# Patient Record
Sex: MALE | Race: WHITE | NOT HISPANIC OR LATINO | Employment: FULL TIME | URBAN - METROPOLITAN AREA
[De-identification: names, ages, dates, MRNs, and addresses within clinical notes are randomized per-mention and may not be internally consistent; named-entity substitution may affect disease eponyms.]

---

## 2023-05-18 ENCOUNTER — APPOINTMENT (OUTPATIENT)
Dept: PRIMARY CARE | Facility: CLINIC | Age: 33
End: 2023-05-18
Payer: COMMERCIAL

## 2023-05-23 ENCOUNTER — APPOINTMENT (OUTPATIENT)
Dept: PRIMARY CARE | Facility: CLINIC | Age: 33
End: 2023-05-23
Payer: COMMERCIAL

## 2025-07-15 NOTE — PROGRESS NOTES
ADULT AUDIOLOGY EVALUATION    Name:  Ashish Burrell  :  1990  Age:  34 y.o.  Date of Evaluation:  2025     IMPRESSIONS     Today's test results indicate normal hearing sensitivity in both ears. Tympanometry indicates normal middle ear functioning bilaterally, with no evidence of current tympanic membrane perforation. Word recognition is excellent in both ears.     RECOMMENDATIONS     Medical follow up with ENT. Patient is scheduled to see APOLLO Newberry directly following today's testing.  Return for annual hearing evaluation or sooner should new concerns arise.    Time: [id number]    HISTORY     Ashish Burrell is seen today for an audiologic evaluation in conjunction with otolaryngology. Patient reports concern for perforated right eardrum. He frequently swims and reports frequent use of alcohol drops in his ears to remove water. Several days ago his head was submerged and he heard loud popping and experienced intense pain in the right ear. He noted that his hearing felt muffled after this incident, but now feels largely improved. Patient endorsed some residual pressure and tenderness in both ears, and may hear a swishing sound in his right ear with head movements. Ashish endorsed a history of swimmer's ear. He denied other tinnitus, dizziness, and history of otologic surgeries.      TEST RESULTS     Otoscopic Evaluation:  Right Ear: Clear ear canal with unremarkable tympanic membrane  Left Ear: Clear ear canal with unremarkable tympanic membrane    Tympanometry:   Right Ear: Normal, type A tympanogram with normal ear canal volume, peak pressure and compliance.   Left Ear: Normal, type A tympanogram with normal ear canal volume, peak pressure and compliance.     Ipsilateral Acoustic Reflexes:   Right Ear: Present 500-2000 Hz, absent 4000 Hz.   Left Ear: Present 500-2000 Hz, absent 4000 Hz.     Pure Tone Audiometry (125-8000 Hz):     Right Ear: Normal hearing sensitivity from 125-8000 Hz. No  air-bone gaps present.     Left Ear: Normal hearing sensitivity from 125-8000 Hz. No air-bone gaps present.     Speech Audiometry:   Right Ear:    Speech Reception Threshold (SRT) was obtained at 10 dBHL using recorded material.   Word Recognition scores were excellent (100%) in quiet when words were presented at 55 dBHL using recorded NU-6 word list ordered by difficulty.  Left Ear:    Speech Reception Threshold (SRT) was obtained at 15 dBHL using recorded material.   Word Recognition scores were excellent (100%) in quiet when words were presented at 55 dBHL using recorded NU-6 word list ordered by difficulty.       PATIENT EDUCATION     Patient was counseled in regard to findings.         Carrie Najera, CCC-A  Clinical Audiologist    Degree of Hearing Sensitivity Decibel Range   Within Normal Limits (WNL) 0-25   Mild 26-40   Moderate 41-55   Moderately-Severe 56-70   Severe 71-90   Profound 91+      Key   CNT/DNT Could Not Test/Did Not Test   TM Tympanic Membrane   WNL Within Normal Limits   HA Hearing Aid   SNHL Sensorineural Hearing Loss   CHL Conductive Hearing Loss   NIHL Noise-Induced Hearing Loss   ECV Ear Canal Volume   MLV Monitored Live Voice     AUDIOGRAM

## 2025-07-17 ENCOUNTER — OFFICE VISIT (OUTPATIENT)
Facility: CLINIC | Age: 35
End: 2025-07-17
Payer: COMMERCIAL

## 2025-07-17 ENCOUNTER — CLINICAL SUPPORT (OUTPATIENT)
Dept: AUDIOLOGY | Facility: CLINIC | Age: 35
End: 2025-07-17
Payer: COMMERCIAL

## 2025-07-17 VITALS — BODY MASS INDEX: 25.38 KG/M2 | HEIGHT: 71 IN

## 2025-07-17 DIAGNOSIS — H92.01 OTALGIA, RIGHT EAR: ICD-10-CM

## 2025-07-17 DIAGNOSIS — H93.8X3 PRESSURE SENSATION IN BOTH EARS: ICD-10-CM

## 2025-07-17 DIAGNOSIS — Z01.10 NORMAL HEARING EXAM: Primary | ICD-10-CM

## 2025-07-17 DIAGNOSIS — H61.893 EAR CANAL DRYNESS, BILATERAL: Primary | ICD-10-CM

## 2025-07-17 DIAGNOSIS — H93.8X3 EAR FULLNESS, BILATERAL: ICD-10-CM

## 2025-07-17 PROCEDURE — 92557 COMPREHENSIVE HEARING TEST: CPT

## 2025-07-17 PROCEDURE — 92504 EAR MICROSCOPY EXAMINATION: CPT

## 2025-07-17 PROCEDURE — 99203 OFFICE O/P NEW LOW 30 MIN: CPT

## 2025-07-17 PROCEDURE — 92550 TYMPANOMETRY & REFLEX THRESH: CPT | Mod: 52

## 2025-07-17 RX ORDER — AMOXICILLIN AND CLAVULANATE POTASSIUM 875; 125 MG/1; MG/1
1 TABLET, FILM COATED ORAL
COMMUNITY
Start: 2025-07-11

## 2025-07-17 NOTE — PROGRESS NOTES
Patient ID: Ashish Burrell is a 34 y.o. male who presents for the evaluation of ***.     Ear canal dryness-- otherwise normal    Ear flushes-- once weekly  Dry ear precautions after swimming  May continue to wear ear plugs while swimming lessions x 6 hrs daily.   Avoid isopropyl alcohol rinses  Fuv prn    P.o. augmentin BID x 10 days on 7/11/25--seen by urgent care    Using o.t.c. swimmer's ear drops.   [place] popping in the ear right ear on 7/8/25-- popping and pain in the ear. Was hearing swishing in the ear.     No pain     PROVIDER IMPRESSIONS:  DIAGNOSES/PROBLEMS:  -    ASSESSMENT:   Ashish Burrell is a pleasant 34 y.o. male who presents with symptoms of ***.   Based on the clinical information provided, symptoms and clinical exam findings are consistent with ***.   Reassurance provided to patient that exam today showed no evidence of acute infection or inflammation in the EAC bilaterally and that TM appears with no evidence of infection, effusion, retraction or perforation bilaterally.  Audiogram reviewed in detail with the patient, which revealed ***.     PLAN:  ***  ***  Follow-up: Patient may schedule for follow-up in {Time; 1 week to 1 year:17682}. They may follow-up sooner, if needed. Patient is agreeable to this plan, all questions were answered to patient's satisfaction.     Subjective   HPI: Ashish Burrell is a 34 y.o. male who presents for the evaluation of ***.  The patient states that symptom onset began ***.   When asked about the presence of hearing loss, ear pain, ear fullness/pressure, tinnitus, ear itching, ear drainage, autophony, dizziness or vertigo, he admits to ***.   When asked about pertinent otologic history, the patient {DENIES/REPORTS EC:89234} a history of recurrent ear infections, {DENIES/REPORTS EC:24966} history of ear surgery, {DENIES/REPORTS EC:35614} history of PE tube insertion, and {DENIES/REPORTS EC:00124} history of prolonged/traumatic loud noise exposure.   The patient  {DOES_DOES NOT:96613} insert Q-tips in the ear canals, and  {DENIES/REPORTS EC:85346} insertion of other foreign objects into the ear canals. The patient {DENIES/REPORTS EC:46547} history of wearing hearing aid devices.   The patient {DOES_DOES NOT:04999} endorse a family history of hearing loss.       PATIENT HISTORY:  Medical History[1]   Surgical History[2]   RX Allergies[3]   Current Medications[4]   Tobacco Use: Not on file      Alcohol Use: Not on file      Social History     Substance and Sexual Activity   Drug Use Not on file        Review of Systems   All other systems negative.     Objective   There were no vitals taken for this visit.     PHYSICAL EXAM:  General appearance: Appears well, well-nourished, well groomed. No acute distress.   Constitutional: No fever, chills, weight loss or weight gain.  Communication: Normal communication  Psychiatric: Oriented to person, place and time. Normal mood and affect.  Neurologic: Cranial nerves II-XII grossly intact and symmetric bilaterally.  Cardiovascular: Examination of peripheral vascular system shows no clubbing or cyanosis.  Respiratory: No respiratory distress increased work of breathing. Inspection of the chest with symmetric chest expansion and normal respiratory effort.  Skin: No head and neck rashes.  Head: Normocephalic. Atraumatic with no masses, lesions or scarring.  Face: Normal symmetry. No scars or deformities.  Eyes: Conjunctiva not edematous or erythematous. PERRLA  Neck: Supple and symmetric, trachea midline. Lymph nodes with no adenopathy.  Head: Normocephalic. Atraumatic with no masses, lesions or scarring.  Eyes: PERRL, EOMI, Conjunctiva is clear. No nystagmus.  Nose: External inspection of nose: No nasal lesions, lacerations or scars. No tenderness on frontal or maxillary sinus palpation.  Anterior rhinoscopy with limited visualization past the inferior turbinates: Septum is [].  No septal perforation or lesions. No septal hematoma/ seroma.   No signs of bleeding.  No evidence of intranasal polyps.  Inferior turbinates are [].    Throat:  Floor of mouth is clear, no masses.  Tongue appears normal, no lesions or masses. Gums, gingiva, buccal mucosa appear pink and moist, no lesions. Teeth are in intact.  No obvious dental infections.  Peritonsillar regions appear symmetric without swelling.  Hard and soft palate appear normal, no obvious cleft. Uvula is midline.  Left Tonsil -- [], no exudates.  Right Tonsil -- [], no exudates.  Oropharynx: No lesions. Retropharyngeal wall is flat.  []postnasal drip.  Salivary Glands: Symmetric bilaterally.  No palpable masses.  No evidence of acute infection or salivary stones.  TMJ: Normal, no trismus.  Right Ear: External inspection of ear with no deformity, scars, or masses. Mastoid is nontender.   External auditory canal is clear.    TM is intact with no sign of infection, effusion, or retraction.  No perforation seen.   Auto insufflation visible under microscopy.  Left Ear: External inspection of ear with no deformity, scars, or masses. Mastoid is nontender.   External auditory canal is clear.    TM is intact with no sign of infection, effusion, or retraction.  No perforation seen.   Auto insufflation visible under microscopy.    RESULTS:  I personally reviewed the patient's audiogram from 07/17/25, and this is my own interpretation of findings: Normal hearing across all frequencies in bilateral ears. Normal tympanogram bilaterally. Excellent speech discrimination scores bilaterally. Acoustic reflexes present right ipsilateral, and present left ipsilateral.     Katy Juan, APRN-CNP          [1] No past medical history on file.  [2] No past surgical history on file.  [3] Not on File  [4] No current outpatient medications on file.     swimming in a pool for up to 6 hrs per day throughout the week. He reports current use of o.t.c. isopropyl alcohol ear solution to perform gentle lavage/irrigation in both ear canals at the end of each day of swimming as routine home maintenance to remove water and prevent ear infection. He has recently been utilizing o.t.c. ear plugs to prevent pool water from entering the ears. He  denies history of ear surgery, denies history of PE tube insertion, and denies history of prolonged/traumatic loud noise exposure. The patient denies history of wearing hearing aid devices. The patient does not endorse a family history of hearing loss. Patient denies any known history of habitual bruxism/jaw-clenching.     PATIENT HISTORY:  Medical History[1]   Surgical History[2]   RX Allergies[3]   Current Medications[4]   Tobacco Use: Not on file      Alcohol Use: Not on file      Social History     Substance and Sexual Activity   Drug Use Not on file        Review of Systems   All other systems negative.     Objective   There were no vitals taken for this visit.     PHYSICAL EXAM:  General appearance: Appears well, well-nourished, well groomed. No acute distress.   Constitutional: No fever, chills, weight loss or weight gain.  Communication: Normal communication  Psychiatric: Oriented to person, place and time. Normal mood and affect.  Neurologic: Cranial nerves II-XII grossly intact and symmetric bilaterally.  Cardiovascular: Examination of peripheral vascular system shows no clubbing or cyanosis.  Respiratory: No respiratory distress increased work of breathing. Inspection of the chest with symmetric chest expansion and normal respiratory effort.  Skin: No head and neck rashes.  Head: Normocephalic. Atraumatic with no masses, lesions or scarring.  Face: Normal symmetry. No scars or deformities.  Eyes: Conjunctiva not edematous or erythematous. PERRLA  Neck: Supple and symmetric, trachea midline. Lymph nodes with no  adenopathy.  Head: Normocephalic. Atraumatic with no masses, lesions or scarring.  Eyes: PERRL, EOMI, Conjunctiva is clear. No nystagmus.  Nose: External inspection of nose: No nasal lesions, lacerations or scars.  Throat:  Floor of mouth is clear, no masses.  Tongue appears normal, no lesions or masses. Gums, gingiva, buccal mucosa appear pink and moist, no lesions. Teeth are in intact.  No obvious dental infections.  Peritonsillar regions appear symmetric without swelling.  Hard and soft palate appear normal, no obvious cleft. Uvula is midline.  Oropharynx: No lesions. Retropharyngeal wall is flat.  No postnasal drip.  Salivary Glands: Symmetric bilaterally.  No palpable masses.  No evidence of acute infection or salivary stones.  TMJ: Normal, no trismus nor crepitus.  Right Ear: External inspection of ear with no deformity, scars, or masses. Mastoid is nontender. External auditory canal is clear.  TM is intact with no sign of infection, effusion, or retraction.  No perforation seen. Auto insufflation visible under microscopy.  Left Ear: External inspection of ear with no deformity, scars, or masses. Mastoid is nontender. External auditory canal is clear.  TM is intact with no sign of infection, effusion, or retraction.  No perforation seen. Auto insufflation visible under microscopy.    RESULTS:  I personally reviewed the patient's audiogram from 07/17/25, and this is my own interpretation of findings: Normal hearing across all frequencies in bilateral ears. Normal tympanogram bilaterally. Excellent speech discrimination scores bilaterally. Acoustic reflexes present right ipsilateral, and present left ipsilateral.     Katy Juan, KWESI-CNP          [1] No past medical history on file.  [2] No past surgical history on file.  [3] Not on File  [4] No current outpatient medications on file.